# Patient Record
Sex: MALE | Race: OTHER | NOT HISPANIC OR LATINO | URBAN - METROPOLITAN AREA
[De-identification: names, ages, dates, MRNs, and addresses within clinical notes are randomized per-mention and may not be internally consistent; named-entity substitution may affect disease eponyms.]

---

## 2019-12-19 ENCOUNTER — EMERGENCY (EMERGENCY)
Facility: HOSPITAL | Age: 44
LOS: 1 days | Discharge: HOME | End: 2019-12-19
Attending: EMERGENCY MEDICINE
Payer: SELF-PAY

## 2019-12-19 VITALS
RESPIRATION RATE: 18 BRPM | HEART RATE: 80 BPM | OXYGEN SATURATION: 96 % | SYSTOLIC BLOOD PRESSURE: 113 MMHG | TEMPERATURE: 98 F | DIASTOLIC BLOOD PRESSURE: 70 MMHG

## 2019-12-19 PROCEDURE — 73110 X-RAY EXAM OF WRIST: CPT

## 2019-12-19 PROCEDURE — 73130 X-RAY EXAM OF HAND: CPT

## 2019-12-19 PROCEDURE — 73110 X-RAY EXAM OF WRIST: CPT | Mod: 26,LT

## 2019-12-19 PROCEDURE — 99283 EMERGENCY DEPT VISIT LOW MDM: CPT

## 2019-12-19 PROCEDURE — 73130 X-RAY EXAM OF HAND: CPT | Mod: 26,LT

## 2019-12-19 PROCEDURE — 99284 EMERGENCY DEPT VISIT MOD MDM: CPT

## 2019-12-19 RX ORDER — IBUPROFEN 200 MG
600 TABLET ORAL ONCE
Refills: 0 | Status: DISCONTINUED | OUTPATIENT
Start: 2019-12-19 | End: 2020-01-11

## 2019-12-19 NOTE — ED PROVIDER NOTE - OBJECTIVE STATEMENT
44 year old male presents to the ED for L hand pain. 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 Also reports pain to the 5th digit and an abrasion. Denies lacs, elbow pain, forearm pain. Pt has not taken anything for pain PTA. Tetanus UTD.

## 2019-12-19 NOTE — ED PROVIDER NOTE - PHYSICAL EXAMINATION
Skin with no rashes, bruises, lesions, intact   Lungs CTA, = chest rise and fall   L 5th digit with TTP at the MCP, pain with flexion and extension  Wrist with nonfocal TTP   A/O x 3, no focal deficits

## 2019-12-19 NOTE — ED PROVIDER NOTE - ATTENDING CONTRIBUTION TO CARE
seen with acp; well appearing adult male with left hand injury; left hand with swelling and abrasion to left 5th metacarpal and MP joint on dorsal surface; no deformity; +neurovasc intact; xray negative; ok for d/c with RICE therapy

## 2019-12-19 NOTE — ED PROVIDER NOTE - PATIENT PORTAL LINK FT
You can access the FollowMyHealth Patient Portal offered by [facility] by registering at the following website: [email or web address]. By joining [facility]’s FollowMyHealth portal, you will also be able to view your health information using other applications (apps) compatible with our system.

## 2019-12-19 NOTE — ED PROVIDER NOTE - CARE PROVIDER_API CALL
Luis Alberto Fields)  Plastic Surgery; Surgery of the Hand  [address] [place] [address], [address]  [place] [address]  Phone: [phone]  Fax: [phone]  Follow Up Time: Routine

## 2024-11-04 NOTE — ED PROCEDURE NOTE - FINGER LOCATION
Melquiades Arnold is a 57 year old male here for  Chief Complaint   Patient presents with    Follow-up     Pancreatic adenocarcinoma     Denies latex allergy or sensitivity.    Medication verified, no changes.  PCP and Pharmacy verified.    Social History     Tobacco Use   Smoking Status Former    Current packs/day: 0.00    Average packs/day: 0.1 packs/day for 20.0 years (2.0 ttl pk-yrs)    Types: Cigarettes    Start date: 11/15/1995    Quit date: 11/15/2015    Years since quittin.9   Smokeless Tobacco Never   Tobacco Comments    smokes rarely     Advance Directives Filed: No    ECOG:   ECOG [24 0913]   ECOG Performance Status 1       Vitals:    Visit Vitals  BP (!) 147/95   Pulse 76   Temp 98.5 °F (36.9 °C) (Oral)   Wt 95.7 kg (211 lb)   SpO2 96%   BMI 27.84 kg/m²       These vital signs are:  Within defined parameters (Per Reference \"Defined Limits Hospital Outpatient Department (HOD)\")    Height: No.  Ht Readings from Last 1 Encounters:   24 6' 1\" (1.854 m)     Weight:Yes, shoes on.  Wt Readings from Last 3 Encounters:   24 95.7 kg (211 lb)   10/21/24 94.7 kg (208 lb 12.8 oz)   10/07/24 95 kg (209 lb 6.4 oz)       BMI: Body mass index is 27.84 kg/m².    REVIEW OF SYSTEMS  GENERAL:  Patient denies headache, fevers, chills, night sweats, excessive fatigue, change in appetite, weight loss, dizziness  ALLERGIC/IMMUNOLOGIC: Verified allergies: Yes  EYES:  Patient denies significant visual difficulties, double vision, blurred vision  ENT/MOUTH: Patient denies problems with hearing, sore throat, sinus drainage, mouth sores  ENDOCRINE:  Patient denies diabetes, thyroid disease, hormone replacement, hot flashes  HEMATOLOGIC/LYMPHATIC: Patient denies easy bruising, bleeding, tender lymph nodes, swollen lymph nodes  BREASTS: Patient denies abnormal masses of breast, nipple discharge, pain  RESPIRATORY:  Patient denies lung pain with breathing, cough, coughing up blood, shortness of  breath  CARDIOVASCULAR:  Patient denies anginal chest pain, palpitations, shortness of breath when lying flat, peripheral edema  GASTROINTESTINAL: Patient denies abdominal pain , nausea, vomiting, diarrhea, GI bleeding, constipation, change in bowel habits, heartburn, sensation of feeling full, difficulty swallowing  : Patient denies blood in the urine, burning with urination, frequency, urgency, hesitancy, incontinence  MUSCULOSKELETAL:  Patient denies joint pain, bone pain, joint swelling, redness, decreased range of motion  SKIN:  Patient denies chronic rashes, inflammation, ulcerations, skin changes, itching  NEUROLOGIC:  Patient denies loss of balance, areas of focal weakness, abnormal gait, sensory problems, numbness, tingling  PSYCHIATRIC: Patient denies insomnia, depression, anxiety    This patient reported abnormal symptoms that needed immediate verbal communication: No     5th digit

## 2025-04-09 NOTE — ED PROVIDER NOTE - QUALITY
& as needed for symptoms of irregular blood glucose. Dispense sufficient amount for indicated testing frequency plus additional to accommodate PRN testing needs. 100 strip 5    pantoprazole (PROTONIX) 40 MG tablet Take 1 tablet by mouth daily Every 3 days or so 90 tablet 3    acetaminophen (TYLENOL) 500 MG tablet Take 1 tablet by mouth every 6 hours as needed for Pain      metoprolol succinate (TOPROL XL) 25 MG extended release tablet Take 1 tablet by mouth daily 90 tablet 3    famotidine (PEPCID) 40 MG tablet Take 1 tablet by mouth every evening 90 tablet 1    apixaban (ELIQUIS) 5 MG TABS tablet Take 1 tablet by mouth 2 times daily 180 tablet 0    spironolactone (ALDACTONE) 25 MG tablet TAKE ONE TABLET BY MOUTH ONE TIME A DAY 90 tablet 3    Prodigy Lancets 28G MISC 1 each by Does not apply route in the morning and at bedtime 100 each 1    Blood Glucose Monitoring Suppl (PRODIGY AUTOCODE BLOOD GLUCOSE) JACKIE 1 each by Does not apply route 2 times daily 1 each 0    budesonide-formoterol (SYMBICORT) 160-4.5 MCG/ACT AERO Inhale 2 puffs into the lungs 2 times daily 6 each 3    albuterol sulfate HFA (PROAIR HFA) 108 (90 Base) MCG/ACT inhaler Inhale 2 puffs into the lungs 4 times daily as needed for Wheezing or Shortness of Breath 1 each 0    blood glucose monitor kit and supplies Dispense sufficient amount for indicated testing frequency plus additional to accommodate QD testing needs. Dispense all needed supplies to include: monitor, strips, lancing device, lancets, control solutions, alcohol swabs. 1 kit 0    linaCLOtide (LINZESS PO) Take by mouth daily as needed      polyethylene glycol (MIRALAX) 17 g PACK packet Take 17 g by mouth daily      PREVIDENT 5000 BOOSTER PLUS 1.1 % PSTE   0    ketorolac (TORADOL) 10 MG tablet Take 1 tablet by mouth every 6 hours as needed for Pain (Patient not taking: Reported on 4/9/2025) 10 tablet 0    albuterol (PROVENTIL) (2.5 MG/3ML) 0.083% nebulizer solution Take 3 mLs by nebulization  aching